# Patient Record
Sex: MALE
[De-identification: names, ages, dates, MRNs, and addresses within clinical notes are randomized per-mention and may not be internally consistent; named-entity substitution may affect disease eponyms.]

---

## 2023-01-06 PROBLEM — Z00.00 ENCOUNTER FOR PREVENTIVE HEALTH EXAMINATION: Status: ACTIVE | Noted: 2023-01-06

## 2023-01-09 ENCOUNTER — APPOINTMENT (OUTPATIENT)
Dept: ORTHOPEDIC SURGERY | Facility: CLINIC | Age: 88
End: 2023-01-09

## 2023-01-16 ENCOUNTER — APPOINTMENT (OUTPATIENT)
Dept: ORTHOPEDIC SURGERY | Facility: CLINIC | Age: 88
End: 2023-01-16
Payer: MEDICARE

## 2023-01-19 ENCOUNTER — APPOINTMENT (OUTPATIENT)
Dept: ORTHOPEDIC SURGERY | Facility: CLINIC | Age: 88
End: 2023-01-19
Payer: MEDICARE

## 2023-01-19 ENCOUNTER — APPOINTMENT (OUTPATIENT)
Dept: ORTHOPEDIC SURGERY | Facility: CLINIC | Age: 88
End: 2023-01-19

## 2023-01-19 DIAGNOSIS — Z78.9 OTHER SPECIFIED HEALTH STATUS: ICD-10-CM

## 2023-01-19 DIAGNOSIS — M79.641 PAIN IN RIGHT HAND: ICD-10-CM

## 2023-01-19 DIAGNOSIS — M79.642 PAIN IN RIGHT HAND: ICD-10-CM

## 2023-01-19 DIAGNOSIS — Z87.891 PERSONAL HISTORY OF NICOTINE DEPENDENCE: ICD-10-CM

## 2023-01-19 PROCEDURE — 73110 X-RAY EXAM OF WRIST: CPT | Mod: 50

## 2023-01-19 PROCEDURE — 99203 OFFICE O/P NEW LOW 30 MIN: CPT

## 2023-01-19 NOTE — DISCUSSION/SUMMARY
[FreeTextEntry1] : He is status post ORIF of bilateral distal radius fractures 2 months ago in [place], which have now healed in good overall alignment.\par \par I had a discussion regarding today's visit, the prognosis of this diagnosis and treatment recommendations and options.  \par \par I told him and his daughter that the fractures have healed and that he can discontinue using the splints.  He was written a referral for hand therapy I told him that he can bear weight as tolerated.  I told him that if they want, he can leave the rehab facility.  He will follow-up in 4 weeks to assess his progress.\par \par The patient has agreed to this plan of management and has expressed full understanding.  All questions were fully answered to the patient's satisfaction.\par \par My cumulative time spent on this patient's visit included: Preparation for the visit, review of the medical records, review of pertinent diagnostic studies, examination and counseling of the patient on the above diagnosis, treatment plan and prognosis, orders of diagnostic tests, medications and/or appropriate procedures and documentation in the medical records of today's visit.

## 2023-01-19 NOTE — HISTORY OF PRESENT ILLNESS
[FreeTextEntry1] : He comes in today for evaluation of bilateral distal radius fractures after a fall on November 20, 2022.  He had surgery in [place].  He has returned here and is now in a rehab facility.  He has been wearing splints.\par \par He was accompanied by his daughter today.  They want to know if he can leave the rehab facility and start moving the wrists.\par \par He has a past medical history which includes type 2 diabetes.

## 2023-01-19 NOTE — PHYSICAL EXAM
[de-identified] : - Constitutional: This is an elderly male in no obvious distress.  He was accompanied by his daughter today.\par - Psych: Patient is alert and oriented to person, place and time.  Patient has a normal mood and affect.\par - Cardiovascular: Normal pulses throughout the upper extremities.  No significant varicosities are noted in the upper extremities. \par - Neuro: Strength and sensation are intact throughout the upper extremities.  Patient has normal coordination.\par - Respiratory:  Patient exhibits no evidence of shortness of breath or difficulty breathing.\par - Skin: No rashes, lesions, or other abnormalities are noted in the upper extremities.\par \par ---\par \par Examination of both wrists after the splints were removed demonstrates bilateral volar incisions where he had a surgery.  There is swelling more notably on the right side.  There is some scabbing at the right incision, but there is no evidence of infection and they are healing well.  He has some limitation terminal flexion and extension of the digits and has approximately 20 degrees of flexion and extension of the wrist.  There are no focal neurologic deficits noted distally. [de-identified] : PA, lateral, oblique and 20 degree tilt lateral views of both wrists demonstrate bilateral distal radius fractures that have healed.  He has volar plates.  There is an associated right distal ulna fracture which is healed as well as bilateral ulnar styloid avulsion fractures.  The fractures are healed in good overall alignment.

## 2023-02-06 ENCOUNTER — NON-APPOINTMENT (OUTPATIENT)
Age: 88
End: 2023-02-06

## 2023-02-09 PROBLEM — S52.502A DISTAL RADIUS FRACTURE, LEFT: Status: ACTIVE | Noted: 2023-01-19

## 2023-02-09 PROBLEM — S52.501A DISTAL RADIUS FRACTURE, RIGHT: Status: ACTIVE | Noted: 2023-01-19

## 2023-02-16 ENCOUNTER — APPOINTMENT (OUTPATIENT)
Dept: ORTHOPEDIC SURGERY | Facility: CLINIC | Age: 88
End: 2023-02-16
Payer: MEDICARE

## 2023-02-16 DIAGNOSIS — S52.501A UNSPECIFIED FRACTURE OF THE LOWER END OF RIGHT RADIUS, INITIAL ENCOUNTER FOR CLOSED FRACTURE: ICD-10-CM

## 2023-02-16 DIAGNOSIS — S52.502A UNSPECIFIED FRACTURE OF THE LOWER END OF LEFT RADIUS, INITIAL ENCOUNTER FOR CLOSED FRACTURE: ICD-10-CM

## 2023-02-16 PROCEDURE — 99214 OFFICE O/P EST MOD 30 MIN: CPT

## 2023-02-16 PROCEDURE — 73110 X-RAY EXAM OF WRIST: CPT | Mod: 50

## 2023-02-16 NOTE — DISCUSSION/SUMMARY
[FreeTextEntry1] : I had a discussion regarding today's visit, the diagnosis and treatment recommendations and options.  We also discussed changes since the last visit.  At this time, I recommended continued occupational therapy in addition to home exercise program.  He will follow-up with me on an as-needed basis.\par \par The patient has agreed to the above plan of management and has expressed full understanding.  All questions were fully answered to the patient's satisfaction.\par \par My cumulative time spent on today's visit was greater than 30 minutes and included: Preparation for the visit, review of the medical records, review of pertinent diagnostic studies, examination and counseling of the patient on the above diagnosis, treatment plan and prognosis, orders of diagnostic tests, medications and/or appropriate procedures and documentation in the medical records of today's visit.

## 2023-02-16 NOTE — PHYSICAL EXAM
[de-identified] : - Constitutional: This is an elderly male in no obvious distress.  He was accompanied by his aide today.\par - Psych: Patient is alert and oriented to person, place and time.  Patient has a normal mood and affect.\par - Cardiovascular: Normal pulses throughout the upper extremities.  No significant varicosities are noted in the upper extremities. \par - Neuro: Strength and sensation are intact throughout the upper extremities.  Patient has normal coordination.\par - Respiratory:  Patient exhibits no evidence of shortness of breath or difficulty breathing.\par - Skin: No rashes, lesions, or other abnormalities are noted in the upper extremities.\par \par ---\par \par Examination of both wrists demonstrates bilateral volar incisions where he had a surgery.  There is residual although decreased swelling, more notable on the right side.  His incisions have fully healed.  He has improved flexion and extension of the digits and wrist.  He remains neurovascularly intact distally. [de-identified] : PA, lateral, oblique radiographs of both wrists demonstrate bilateral distal radius fractures that have fully healed.  He has volar plates.  There is an associated right distal ulna fracture which is healed as well as bilateral ulnar styloid avulsion fractures.  The fractures are healed in good alignment.

## 2023-02-16 NOTE — ADDENDUM
[FreeTextEntry1] : I, Anahi Brenner, acted solely as a scribe for Dr. Michele on this date on 02/16/2023.		\par

## 2023-02-16 NOTE — HISTORY OF PRESENT ILLNESS
[FreeTextEntry1] : Follow-up regarding bilateral distal radius fractures after a fall on November 20, 2022.  He is status post ORIF of both fractures in [place].\par \par See note from when he was seen in the office 4 weeks ago.  He was referred to hand therapy\par \par He is improved.  He has left inpatient therapy and is at home doing therapy.\par \par He was accompanied by his aide today.\par \par \par He has a past medical history which includes type 2 diabetes.

## 2023-02-16 NOTE — END OF VISIT
[FreeTextEntry3] : This note was written by Anahi Brenner on 02/16/2023 acting solely as a scribe for Dr. Dat Michele.\par  \par All medical record entries made by the Scribe were at my, Dr. Dat Michele, direction and personally dictated by me on 02/16/2023. I have personally reviewed the chart and agree that the record accurately reflects my personal performance of the history, physical exam, assessment and plan.		\par